# Patient Record
(demographics unavailable — no encounter records)

---

## 2024-11-18 NOTE — ASSESSMENT
[FreeTextEntry1] : Plan ----chronic stable HIV and acute STD testing and ankle pain and needs flu and HPV vaccinations today ( 30 minutes spent in total)  1) continue Biktarvy -25mg and vit D 2000ut daily and Doxycycline PEP as directed( reviewed and renewed) 2) reviewed previous labs and new labs today including cbc, bmp, tsh, cd4, urine 3) flu vaccine and HPV vaccine today  4) see in 3 months 5) external provider notes reviewed 6) podiatry referral for both ankles popping and cracking daily

## 2024-11-18 NOTE — HISTORY OF PRESENT ILLNESS
[FreeTextEntry1] : Reason For Visit---chronic stable HIV and acute STD testing and ankle pain and needs flu and HPV vaccinations today  ---BLANCA KLEIN is a 30 year old male here for a HIV follow-up for Dr. Wang. 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 Patient diagnosed with HIV on 7/15/19. 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 He started Biktarvy on July 24th 2019, and has been tolerating the medication well. He currently has a boyfriend and uses condoms at all encounters. Now undetectable noted medium size hemmoroid...needs color rectal Pt rectal HPV positive, anal pap negative. 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

## 2025-03-31 NOTE — HISTORY OF PRESENT ILLNESS
[FreeTextEntry1] :     Reason For Visit---chronic stable HIV and acute STD testing and ankle pain and needs flu and HPV vaccinations today ---BLANCA KLEIN is a 30 year old male here for a HIV follow-up for Dr. Wang. 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 Patient diagnosed with HIV on 7/15/19. He continues to work at [employer] as an  and is working on his CDL license. He started Biktarvy on July 24th 2019, and has been tolerating the medication well. He currently has a boyfriend and uses condoms at all encounters. Now undetectable noted medium size hemmoroid...needs color rectal Pt rectal HPV positive, anal pap negative. 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  Plan ----chronic stable HIV and acute STD testing and ankle pain and needs flu and HPV vaccinations today ( 30 minutes spent in total) 1) continue Biktarvy -25mg and vit D 2000ut daily and Doxycycline PEP as directed( reviewed and renewed) 2) reviewed previous labs and new labs today including cbc, bmp, tsh, cd4, urine 3)  HPV vaccine today and interested in Cabenuva 4) see in 6 months 5) external provider notes reviewed 6) podiatry referral for both ankles popping and cracking daily  Active Problems Ankle pain (719.47) (M25.579) Chlamydia (079.98) (A74.9) Encounter for immunization (V03.89) (Z23) Exposure to communicable diseases (V01.9) (Z20.9) Gonorrhea (098.0) (A54.9) HIV infection (V08) (Z21) Jaw pain (784.92) (R68.84) Localized rash (782.1) (R21) 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) Rash of penis (607.9) (R21) Rectal lesion (569.49) (K62.9) Right shoulder pain (719.41) (M25.511) Syphilis (097.9) (A53.9)        Past Medical History History of dental examination (V15.89) (Z92.89) History of Patient denies medical problems (V49.89) (Z78.9) History of Visit for eye and vision exam (V72.0) (Z01.00)        Current Meds Biktarvy -25 MG Oral Tablet; TAKE ONE TABLET BY MOUTH DAILY Clotrimazole-Betamethasone 1-0.05 % External Cream; APPLY  AND RUB  IN A THIN FILM TO AFFECTED AREAS TWICE DAILY.(AM AND PM) D3 Super Strength 50 MCG (2000 UT) Oral Capsule; TAKE 1 CAPSULE BY MOUTH DAILY Doxycycline Hyclate 100 MG Oral Capsule; TAKE 1 CAPSULE EVERY 12 HOURS DAILY Doxycycline Hyclate 100 MG Oral Capsule; TAKE 2 CAPSULE Once As Directed Take two capsules within 24 hours of the sexual encounter MDD:2 capsules TDD:2 capsules SV Vitamin D3 50 MCG Oral Capsule; TAKE 1 CAPSULE BY MOUTH DAILY Vitamin D3 50 MCG (2000 UT) Oral Capsule; TAKE 1 CAPSULE BY MOUTH DAILY        Allergies No Known Drug Allergies